# Patient Record
Sex: FEMALE | Race: WHITE | NOT HISPANIC OR LATINO | URBAN - METROPOLITAN AREA
[De-identification: names, ages, dates, MRNs, and addresses within clinical notes are randomized per-mention and may not be internally consistent; named-entity substitution may affect disease eponyms.]

---

## 2024-04-20 ENCOUNTER — OFFICE VISIT (OUTPATIENT)
Dept: URGENT CARE | Facility: CLINIC | Age: 54
End: 2024-04-20
Payer: COMMERCIAL

## 2024-04-20 VITALS
RESPIRATION RATE: 18 BRPM | OXYGEN SATURATION: 98 % | SYSTOLIC BLOOD PRESSURE: 130 MMHG | HEART RATE: 87 BPM | DIASTOLIC BLOOD PRESSURE: 85 MMHG | BODY MASS INDEX: 25.61 KG/M2 | TEMPERATURE: 97.8 F | WEIGHT: 140 LBS

## 2024-04-20 DIAGNOSIS — J01.10 ACUTE NON-RECURRENT FRONTAL SINUSITIS: Primary | ICD-10-CM

## 2024-04-20 DIAGNOSIS — J02.9 SORE THROAT: ICD-10-CM

## 2024-04-20 LAB — POC RAPID STREP: NEGATIVE

## 2024-04-20 PROCEDURE — 87880 STREP A ASSAY W/OPTIC: CPT

## 2024-04-20 PROCEDURE — 99204 OFFICE O/P NEW MOD 45 MIN: CPT

## 2024-04-20 RX ORDER — AMOXICILLIN AND CLAVULANATE POTASSIUM 875; 125 MG/1; MG/1
1 TABLET, FILM COATED ORAL 2 TIMES DAILY
Qty: 14 TABLET | Refills: 0 | Status: SHIPPED | OUTPATIENT
Start: 2024-04-20 | End: 2024-04-27

## 2024-04-20 ASSESSMENT — ENCOUNTER SYMPTOMS
DIARRHEA: 0
RHINORRHEA: 0
CHILLS: 1
MYALGIAS: 0
VOMITING: 0
NAUSEA: 0
HEADACHES: 1
CHEST TIGHTNESS: 0
SINUS PRESSURE: 1
FEVER: 0
ABDOMINAL PAIN: 0
COUGH: 1
SHORTNESS OF BREATH: 0
SORE THROAT: 1
WEAKNESS: 0

## 2024-04-20 ASSESSMENT — PAIN SCALES - GENERAL: PAINLEVEL: 7

## 2024-04-20 NOTE — PROGRESS NOTES
Subjective   Patient ID: Veronica Mcdaniel is a 53 y.o. female.      Patient presents urgent care for complaints of sore throat, sinus pressure, facial pain, congestion, headache, chills for the last 6 days.  Patient states that the sore throat is bothering her the worst and states that it is extremely painful to swallow.  Patient denies any known fevers.  Patient denies any nausea, vomiting, diarrhea.  Patient denies any chest pain, shortness of breath, difficulty breathing.  Patient states that she has been taking an over-the-counter cough and cold medication with minimal relief of symptoms.      Review of Systems   Constitutional:  Positive for chills. Negative for fever.   HENT:  Positive for congestion, sinus pressure and sore throat. Negative for ear pain and rhinorrhea.    Respiratory:  Positive for cough. Negative for chest tightness and shortness of breath.    Cardiovascular:  Negative for chest pain.   Gastrointestinal:  Negative for abdominal pain, diarrhea, nausea and vomiting.   Musculoskeletal:  Negative for myalgias.   Neurological:  Positive for headaches. Negative for weakness.     Objective   Physical Exam  Constitutional:       Appearance: Normal appearance.   HENT:      Head: Normocephalic and atraumatic.      Right Ear: Tympanic membrane, ear canal and external ear normal.      Left Ear: Tympanic membrane, ear canal and external ear normal.      Nose: Congestion present.      Right Sinus: Frontal sinus tenderness present.      Left Sinus: Frontal sinus tenderness present.      Mouth/Throat:      Mouth: Mucous membranes are moist.      Pharynx: Uvula midline. Oropharyngeal exudate and posterior oropharyngeal erythema present.      Tonsils: No tonsillar abscesses. 2+ on the right. 2+ on the left.   Eyes:      Extraocular Movements: Extraocular movements intact.      Conjunctiva/sclera: Conjunctivae normal.      Pupils: Pupils are equal, round, and reactive to light.   Cardiovascular:      Rate  and Rhythm: Normal rate and regular rhythm.      Pulses: Normal pulses.      Heart sounds: Normal heart sounds.   Pulmonary:      Effort: Pulmonary effort is normal.      Breath sounds: Normal breath sounds.   Abdominal:      General: Abdomen is flat. Bowel sounds are normal.      Palpations: Abdomen is soft.   Musculoskeletal:         General: Normal range of motion.      Cervical back: Normal range of motion and neck supple.   Skin:     General: Skin is warm and dry.      Capillary Refill: Capillary refill takes less than 2 seconds.   Neurological:      General: No focal deficit present.      Mental Status: She is alert and oriented to person, place, and time.       Discussed with patient that rapid strep was negative here in office today.  However I am going to treat her for a sinus infection, starting on Augmentin twice daily for the next 7 days.  Discussed with patient that if she is to develop any fevers, shortness of breath, difficulty breathing, inability to swallow, drooling she is to proceed to the ER for further evaluation.  Patient agreed and is understanding.    Assessment/Plan   Problem List Items Addressed This Visit             ICD-10-CM    Acute non-recurrent frontal sinusitis - Primary J01.10    Sore throat J02.9    Relevant Orders    POCT rapid strep A manually resulted       Patient disposition: Home